# Patient Record
Sex: MALE | Race: WHITE | HISPANIC OR LATINO | Employment: OTHER | URBAN - METROPOLITAN AREA
[De-identification: names, ages, dates, MRNs, and addresses within clinical notes are randomized per-mention and may not be internally consistent; named-entity substitution may affect disease eponyms.]

---

## 2020-07-08 ENCOUNTER — HOSPITAL ENCOUNTER (EMERGENCY)
Facility: HOSPITAL | Age: 30
Discharge: HOME | End: 2020-07-08
Attending: EMERGENCY MEDICINE
Payer: OTHER GOVERNMENT

## 2020-07-08 VITALS
HEART RATE: 74 BPM | TEMPERATURE: 99 F | SYSTOLIC BLOOD PRESSURE: 143 MMHG | OXYGEN SATURATION: 95 % | RESPIRATION RATE: 18 BRPM | WEIGHT: 220 LBS | BODY MASS INDEX: 32.58 KG/M2 | DIASTOLIC BLOOD PRESSURE: 91 MMHG | HEIGHT: 69 IN

## 2020-07-08 DIAGNOSIS — R10.9 FLANK PAIN: ICD-10-CM

## 2020-07-08 DIAGNOSIS — R10.9 RIGHT FLANK PAIN: Primary | ICD-10-CM

## 2020-07-08 LAB
ALBUMIN SERPL BCP-MCNC: 4.6 G/DL (ref 3.5–5.2)
ALP SERPL-CCNC: 86 U/L (ref 55–135)
ALT SERPL W/O P-5'-P-CCNC: 186 U/L (ref 10–44)
AMORPH CRY URNS QL MICRO: NORMAL
ANION GAP SERPL CALC-SCNC: 7 MMOL/L (ref 8–16)
AST SERPL-CCNC: 71 U/L (ref 10–40)
BACTERIA #/AREA URNS HPF: NORMAL /HPF
BASOPHILS # BLD AUTO: 0.05 K/UL (ref 0–0.2)
BASOPHILS NFR BLD: 1 % (ref 0–1.9)
BILIRUB SERPL-MCNC: 0.7 MG/DL (ref 0.1–1)
BILIRUB UR QL STRIP: NEGATIVE
BUN SERPL-MCNC: 11 MG/DL (ref 6–20)
CALCIUM SERPL-MCNC: 9.7 MG/DL (ref 8.7–10.5)
CHLORIDE SERPL-SCNC: 103 MMOL/L (ref 95–110)
CLARITY UR: CLEAR
CO2 SERPL-SCNC: 27 MMOL/L (ref 23–29)
COLOR UR: YELLOW
CREAT SERPL-MCNC: 0.9 MG/DL (ref 0.5–1.4)
DIFFERENTIAL METHOD: ABNORMAL
EOSINOPHIL # BLD AUTO: 0 K/UL (ref 0–0.5)
EOSINOPHIL NFR BLD: 0.8 % (ref 0–8)
ERYTHROCYTE [DISTWIDTH] IN BLOOD BY AUTOMATED COUNT: 12.8 % (ref 11.5–14.5)
EST. GFR  (AFRICAN AMERICAN): >60 ML/MIN/1.73 M^2
EST. GFR  (NON AFRICAN AMERICAN): >60 ML/MIN/1.73 M^2
GLUCOSE SERPL-MCNC: 98 MG/DL (ref 70–110)
GLUCOSE UR QL STRIP: NEGATIVE
HCT VFR BLD AUTO: 47.6 % (ref 40–54)
HGB BLD-MCNC: 16.4 G/DL (ref 14–18)
HGB UR QL STRIP: ABNORMAL
IMM GRANULOCYTES # BLD AUTO: 0.08 K/UL (ref 0–0.04)
IMM GRANULOCYTES NFR BLD AUTO: 1.7 % (ref 0–0.5)
KETONES UR QL STRIP: NEGATIVE
LEUKOCYTE ESTERASE UR QL STRIP: NEGATIVE
LIPASE SERPL-CCNC: 24 U/L (ref 4–60)
LYMPHOCYTES # BLD AUTO: 1.4 K/UL (ref 1–4.8)
LYMPHOCYTES NFR BLD: 28.4 % (ref 18–48)
MCH RBC QN AUTO: 29.5 PG (ref 27–31)
MCHC RBC AUTO-ENTMCNC: 34.5 G/DL (ref 32–36)
MCV RBC AUTO: 86 FL (ref 82–98)
MICROSCOPIC COMMENT: NORMAL
MONOCYTES # BLD AUTO: 0.4 K/UL (ref 0.3–1)
MONOCYTES NFR BLD: 8.3 % (ref 4–15)
NEUTROPHILS # BLD AUTO: 2.9 K/UL (ref 1.8–7.7)
NEUTROPHILS NFR BLD: 59.8 % (ref 38–73)
NITRITE UR QL STRIP: NEGATIVE
NRBC BLD-RTO: 0 /100 WBC
PH UR STRIP: 7 [PH] (ref 5–8)
PLATELET # BLD AUTO: 174 K/UL (ref 150–350)
PMV BLD AUTO: 10.5 FL (ref 9.2–12.9)
POTASSIUM SERPL-SCNC: 3.9 MMOL/L (ref 3.5–5.1)
PROT SERPL-MCNC: 7.6 G/DL (ref 6–8.4)
PROT UR QL STRIP: NEGATIVE
RBC # BLD AUTO: 5.56 M/UL (ref 4.6–6.2)
RBC #/AREA URNS HPF: 1 /HPF (ref 0–4)
SARS-COV-2 RDRP RESP QL NAA+PROBE: NEGATIVE
SODIUM SERPL-SCNC: 137 MMOL/L (ref 136–145)
SP GR UR STRIP: 1.01 (ref 1–1.03)
URN SPEC COLLECT METH UR: ABNORMAL
UROBILINOGEN UR STRIP-ACNC: NEGATIVE EU/DL
WBC # BLD AUTO: 4.83 K/UL (ref 3.9–12.7)
WBC #/AREA URNS HPF: 1 /HPF (ref 0–5)

## 2020-07-08 PROCEDURE — 85025 COMPLETE CBC W/AUTO DIFF WBC: CPT

## 2020-07-08 PROCEDURE — 80053 COMPREHEN METABOLIC PANEL: CPT

## 2020-07-08 PROCEDURE — 83690 ASSAY OF LIPASE: CPT

## 2020-07-08 PROCEDURE — 81000 URINALYSIS NONAUTO W/SCOPE: CPT

## 2020-07-08 PROCEDURE — 63600175 PHARM REV CODE 636 W HCPCS: Performed by: PHYSICIAN ASSISTANT

## 2020-07-08 PROCEDURE — 99285 EMERGENCY DEPT VISIT HI MDM: CPT | Mod: 25

## 2020-07-08 PROCEDURE — 96374 THER/PROPH/DIAG INJ IV PUSH: CPT

## 2020-07-08 PROCEDURE — U0002 COVID-19 LAB TEST NON-CDC: HCPCS

## 2020-07-08 RX ORDER — KETOROLAC TROMETHAMINE 30 MG/ML
15 INJECTION, SOLUTION INTRAMUSCULAR; INTRAVENOUS
Status: COMPLETED | OUTPATIENT
Start: 2020-07-08 | End: 2020-07-08

## 2020-07-08 RX ORDER — IBUPROFEN 600 MG/1
600 TABLET ORAL EVERY 6 HOURS PRN
Qty: 20 TABLET | Refills: 0 | Status: SHIPPED | OUTPATIENT
Start: 2020-07-08

## 2020-07-08 RX ORDER — ACETAMINOPHEN 500 MG
1000 TABLET ORAL EVERY 6 HOURS PRN
Qty: 60 TABLET | Refills: 0 | Status: SHIPPED | OUTPATIENT
Start: 2020-07-08

## 2020-07-08 RX ADMIN — KETOROLAC TROMETHAMINE 15 MG: 30 INJECTION, SOLUTION INTRAMUSCULAR at 09:07

## 2020-07-08 NOTE — ED PROVIDER NOTES
Encounter Date: 7/8/2020    SCRIBE #1 NOTE: I, Kaushal Gomes, am scribing for, and in the presence of,  Leonel Zhong MD. I have scribed the following portions of the note - Other sections scribed: HPI, ROS, PE.       History     Chief Complaint   Patient presents with    Flank Pain     Pt c/o constant RIGHT-sided flank pain x3 weeks. Denies abd pain, N/V/D, dysuria. Pain is 10/10. Denies taking meds PTA     CC: Flank Pain    HPI: This 30 y.o. Male with no pertinent past medical history presents to the emergency room for an evaluation of constant throbbing R flank pain for the past 3 weeks. Pt's pain worsened this morning. His pain is worse with moving during the day and goes away at night. Pt has worse pain leaning to the left. He denies dysuria, hematuria, fever, chills, nausea, vomiting or diarrhea. Pt has no daily medications. He did not take any pain medications. Pt has no other symptoms besides R flank pain.    The history is provided by the patient. No  was used.     Review of patient's allergies indicates:  No Known Allergies  History reviewed. No pertinent past medical history.  History reviewed. No pertinent surgical history.  History reviewed. No pertinent family history.  Social History     Tobacco Use    Smoking status: Never Smoker    Smokeless tobacco: Never Used   Substance Use Topics    Alcohol use: Not on file     Comment: socially    Drug use: Never     Review of Systems   Constitutional: Negative for chills and fever.   HENT: Negative for sore throat.    Respiratory: Negative for shortness of breath.    Cardiovascular: Negative for chest pain.   Gastrointestinal: Negative for abdominal pain, diarrhea, nausea and vomiting.   Genitourinary: Positive for flank pain. Negative for dysuria and hematuria.   Musculoskeletal: Negative for back pain.   Skin: Negative for rash.   Neurological: Negative for weakness.   Hematological: Does not bruise/bleed easily.       Physical  Exam     Initial Vitals [07/08/20 0825]   BP Pulse Resp Temp SpO2   (!) 183/115 92 18 98.6 °F (37 °C) 96 %      MAP       --         Physical Exam    Nursing note and vitals reviewed.  Constitutional: He appears well-developed and well-nourished. He is not diaphoretic. No distress.   HENT:   Head: Normocephalic and atraumatic.   Nose: Nose normal.   Mouth/Throat: Oropharynx is clear and moist. No oropharyngeal exudate.   Eyes: Conjunctivae and EOM are normal. Pupils are equal, round, and reactive to light. No scleral icterus.   Neck: Normal range of motion. Neck supple. No tracheal deviation present. No JVD present.   Cardiovascular: Normal rate, regular rhythm, normal heart sounds and intact distal pulses.   No murmur heard.  Pulmonary/Chest: Breath sounds normal. No stridor. No respiratory distress. He has no wheezes. He has no rhonchi. He has no rales.   Abdominal: Soft. Bowel sounds are normal. He exhibits no distension. There is no abdominal tenderness. There is no rebound and no guarding.   Musculoskeletal: Normal range of motion. No tenderness or edema.   Neurological: He is alert and oriented to person, place, and time. He has normal strength. No cranial nerve deficit or sensory deficit. GCS score is 15. GCS eye subscore is 4. GCS verbal subscore is 5. GCS motor subscore is 6.   Skin: Skin is warm and dry. Capillary refill takes less than 2 seconds. No rash noted. No erythema.   Psychiatric: He has a normal mood and affect.         ED Course   Procedures  Labs Reviewed   CBC W/ AUTO DIFFERENTIAL - Abnormal; Notable for the following components:       Result Value    Immature Granulocytes 1.7 (*)     Immature Grans (Abs) 0.08 (*)     All other components within normal limits   COMPREHENSIVE METABOLIC PANEL - Abnormal; Notable for the following components:    AST 71 (*)      (*)     Anion Gap 7 (*)     All other components within normal limits   URINALYSIS, REFLEX TO URINE CULTURE - Abnormal; Notable  for the following components:    Occult Blood UA 1+ (*)     All other components within normal limits    Narrative:     Specimen Source->Urine   LIPASE   SARS-COV-2 RNA AMPLIFICATION, QUAL   URINALYSIS MICROSCOPIC    Narrative:     Specimen Source->Urine          Imaging Results          X-Ray Chest AP Portable (Final result)  Result time 07/08/20 10:11:28    Final result by Klaus Salcedo MD (07/08/20 10:11:28)                 Impression:      As above.      Electronically signed by: Klaus Salcedo  Date:    07/08/2020  Time:    10:11             Narrative:    EXAMINATION:  XR CHEST AP PORTABLE    CLINICAL HISTORY:  Unspecified abdominal pain    TECHNIQUE:  Single frontal view of the chest was performed.    COMPARISON:  None available    FINDINGS:  No large airspace opacity or lobar consolidation.  No pleural effusion or pneumothorax.  Cardiomediastinal silhouette is within normal limits.  No acute osseous abnormality.                               CT Renal Stone Study ABD Pelvis WO (Final result)  Result time 07/08/20 09:54:40    Final result by Ayo Berry Jr., MD (07/08/20 09:54:40)                 Impression:      Bilateral nonobstructing nephrolithiasis      Electronically signed by: Ayo Crum Jr  Date:    07/08/2020  Time:    09:54             Narrative:    EXAMINATION:  CT RENAL STONE STUDY ABD PELVIS WO    CLINICAL HISTORY:  Flank pain, kidney stone suspected;    TECHNIQUE:  Low dose axial images, sagittal and coronal reformations were obtained from the lung bases to the pubic symphysis.  Contrast was not administered.    COMPARISON:  None    FINDINGS:  Lung Bases: Unremarkable.    Kidneys/ Ureters: There is a 2 mm nonobstructing stone in the midpole of the right kidney and 5 mm nonobstructing stone in the lower pole of left kidney.  No ureteral stones or hydronephrosis.    Liver: Enlarged and hypoattenuating compatible with severe fatty infiltration.    Gallbladder: No calcified  gallstones.    Bile Ducts: No evidence of dilated ducts.    Pancreas: No mass or peripancreatic fat stranding.    Spleen: Unremarkable.    Adrenals: Unremarkable.    Pelvic organs: Unremarkable.    GI Tract/Mesentery: No evidence of bowel obstruction or inflammation.    Retroperitoneum: No significant adenopathy.    Vasculature: No significant atherosclerosis or aneurysm.    Bones: Unremarkable.                                            Scribe Attestation:   Scribe #1: I performed the above scribed service and the documentation accurately describes the services I performed. I attest to the accuracy of the note.        MDM:    30 y.o.male with PMHx as noted above presents with bilateral flank pain, R>L. Physical exam remarkable for well appearing male, steady gait, abdomen soft, nt/nd, no CVA ttp.  ED workup remarkable for CBC/CMP - wnl, lipase - 24, u/a - unremarkble, COVID negative, CT renal with bilateral non-obstructing nephrolithiasis.  Pt presentation consistent with bilateral flank pain of likely MSK etiology with pain worse with movement, and going away at night with rest, exacerbated by movement, without acute finding noted. At this time given patient's history, physical exam, and ED workup do not suspect pancreatitis, testicular torsion, cholecystitis, appendicitis, SBO, diverticulitis, MI/ACS, acute hepatitis, pyelonephritis, ureterolithiasis, or any further malignant cause.  Pt advised on further sxm treatment as needed with tylenol/motrin.  Discussed diagnosis and further treatment with patient, including f/u with PCP in the next few days.  Return precautions given and all questions answered.  Patient in understanding of plan.  Pt discharged to home improved and stable.                          Clinical Impression:       ICD-10-CM ICD-9-CM   1. Right flank pain  R10.9 789.09   2. Flank pain  R10.9 789.09             ED Disposition Condition    Discharge Stable        ED Prescriptions     Medication Sig  Dispense Start Date End Date Auth. Provider    acetaminophen (TYLENOL) 500 MG tablet Take 2 tablets (1,000 mg total) by mouth every 6 (six) hours as needed. 60 tablet 7/8/2020  Leonel Zhong MD    ibuprofen (ADVIL,MOTRIN) 600 MG tablet Take 1 tablet (600 mg total) by mouth every 6 (six) hours as needed. 20 tablet 7/8/2020  Leonel Zhong MD        Follow-up Information     Follow up With Specialties Details Why Contact Info    [facility] Go to  If symptoms worsen [address] [facility] [id number] [phone]                      Scribe attestation: I, Leonel Zhong M.D., personally performed the services described in this documentation. All medical record entries made by the scribe were at my direction and in my presence.  I have reviewed the chart and agree that the record reflects my personal performance and is accurate and complete.               Leonel hZong MD  07/09/20 1921

## 2020-07-08 NOTE — ED TRIAGE NOTES
Pt reports R flank pain (10/10) starting 3 weeks ago that has been constant and unrelieved. Pt reports pain causing difficulty in ambulation and positioning. Pt denies fevers/chills, abdominal pain, dysuria. Pt reports numbness and tingling in both feet. NAD.

## 2021-01-20 ENCOUNTER — HOSPITAL ENCOUNTER (EMERGENCY)
Facility: HOSPITAL | Age: 31
Discharge: HOME | End: 2021-01-20
Attending: EMERGENCY MEDICINE
Payer: OTHER GOVERNMENT

## 2021-01-20 VITALS
TEMPERATURE: 99 F | HEART RATE: 72 BPM | RESPIRATION RATE: 18 BRPM | BODY MASS INDEX: 34.07 KG/M2 | HEIGHT: 69 IN | DIASTOLIC BLOOD PRESSURE: 95 MMHG | WEIGHT: 230 LBS | SYSTOLIC BLOOD PRESSURE: 170 MMHG | OXYGEN SATURATION: 95 %

## 2021-01-20 DIAGNOSIS — M79.604 RIGHT LEG PAIN: ICD-10-CM

## 2021-01-20 DIAGNOSIS — M54.41 ACUTE RIGHT-SIDED LOW BACK PAIN WITH RIGHT-SIDED SCIATICA: Primary | ICD-10-CM

## 2021-01-20 DIAGNOSIS — M54.31 SCIATICA OF RIGHT SIDE: ICD-10-CM

## 2021-01-20 PROCEDURE — 96372 THER/PROPH/DIAG INJ SC/IM: CPT | Mod: 59

## 2021-01-20 PROCEDURE — 25000003 PHARM REV CODE 250: Performed by: PHYSICIAN ASSISTANT

## 2021-01-20 PROCEDURE — 99284 EMERGENCY DEPT VISIT MOD MDM: CPT | Mod: 25

## 2021-01-20 PROCEDURE — 63600175 PHARM REV CODE 636 W HCPCS: Performed by: PHYSICIAN ASSISTANT

## 2021-01-20 RX ORDER — LIDOCAINE 50 MG/G
1 PATCH TOPICAL
Status: DISCONTINUED | OUTPATIENT
Start: 2021-01-20 | End: 2021-01-20 | Stop reason: HOSPADM

## 2021-01-20 RX ORDER — LIDOCAINE 50 MG/G
1 PATCH TOPICAL DAILY
Qty: 15 PATCH | Refills: 0 | Status: SHIPPED | OUTPATIENT
Start: 2021-01-20

## 2021-01-20 RX ORDER — HYDROCODONE BITARTRATE AND ACETAMINOPHEN 5; 325 MG/1; MG/1
1 TABLET ORAL EVERY 6 HOURS PRN
Qty: 11 TABLET | Refills: 0 | Status: SHIPPED | OUTPATIENT
Start: 2021-01-20

## 2021-01-20 RX ORDER — TRIAMCINOLONE ACETONIDE 40 MG/ML
60 INJECTION, SUSPENSION INTRA-ARTICULAR; INTRAMUSCULAR
Status: COMPLETED | OUTPATIENT
Start: 2021-01-20 | End: 2021-01-20

## 2021-01-20 RX ORDER — KETOROLAC TROMETHAMINE 30 MG/ML
30 INJECTION, SOLUTION INTRAMUSCULAR; INTRAVENOUS
Status: COMPLETED | OUTPATIENT
Start: 2021-01-20 | End: 2021-01-20

## 2021-01-20 RX ORDER — KETOROLAC TROMETHAMINE 10 MG/1
10 TABLET, FILM COATED ORAL EVERY 6 HOURS
Qty: 20 TABLET | Refills: 0 | Status: SHIPPED | OUTPATIENT
Start: 2021-01-20 | End: 2021-01-25

## 2021-01-20 RX ADMIN — KETOROLAC TROMETHAMINE 30 MG: 30 INJECTION, SOLUTION INTRAMUSCULAR at 01:01

## 2021-01-20 RX ADMIN — TRIAMCINOLONE ACETONIDE 60 MG: 40 INJECTION, SUSPENSION INTRA-ARTICULAR; INTRAMUSCULAR at 01:01

## 2021-01-20 RX ADMIN — LIDOCAINE 1 PATCH: 50 PATCH TOPICAL at 01:01

## 2021-02-19 ENCOUNTER — HOSPITAL ENCOUNTER (EMERGENCY)
Facility: HOSPITAL | Age: 31
Discharge: HOME | End: 2021-02-19
Attending: EMERGENCY MEDICINE
Payer: OTHER GOVERNMENT

## 2021-02-19 VITALS
WEIGHT: 220 LBS | TEMPERATURE: 98 F | RESPIRATION RATE: 18 BRPM | DIASTOLIC BLOOD PRESSURE: 87 MMHG | HEIGHT: 69 IN | SYSTOLIC BLOOD PRESSURE: 197 MMHG | HEART RATE: 71 BPM | OXYGEN SATURATION: 100 % | BODY MASS INDEX: 32.58 KG/M2

## 2021-02-19 DIAGNOSIS — M54.41 ACUTE RIGHT-SIDED LOW BACK PAIN WITH RIGHT-SIDED SCIATICA: Primary | ICD-10-CM

## 2021-02-19 PROBLEM — Z72.0 TOBACCO USER: Status: ACTIVE | Noted: 2021-02-19

## 2021-02-19 PROBLEM — F41.9 ANXIETY: Status: ACTIVE | Noted: 2021-02-19

## 2021-02-19 PROCEDURE — 63600175 PHARM REV CODE 636 W HCPCS: Performed by: PHYSICIAN ASSISTANT

## 2021-02-19 PROCEDURE — 99284 EMERGENCY DEPT VISIT MOD MDM: CPT | Mod: 25

## 2021-02-19 PROCEDURE — 96372 THER/PROPH/DIAG INJ SC/IM: CPT

## 2021-02-19 RX ORDER — PREDNISONE 20 MG/1
60 TABLET ORAL
Status: COMPLETED | OUTPATIENT
Start: 2021-02-19 | End: 2021-02-19

## 2021-02-19 RX ORDER — KETOROLAC TROMETHAMINE 10 MG/1
10 TABLET, FILM COATED ORAL EVERY 6 HOURS
Qty: 20 TABLET | Refills: 0 | Status: SHIPPED | OUTPATIENT
Start: 2021-02-19 | End: 2021-02-24

## 2021-02-19 RX ORDER — PREDNISONE 20 MG/1
60 TABLET ORAL DAILY
Qty: 9 TABLET | Refills: 0 | Status: SHIPPED | OUTPATIENT
Start: 2021-02-19 | End: 2021-02-22

## 2021-02-19 RX ORDER — KETOROLAC TROMETHAMINE 30 MG/ML
30 INJECTION, SOLUTION INTRAMUSCULAR; INTRAVENOUS
Status: COMPLETED | OUTPATIENT
Start: 2021-02-19 | End: 2021-02-19

## 2021-02-19 RX ADMIN — PREDNISONE 60 MG: 20 TABLET ORAL at 04:02

## 2021-02-19 RX ADMIN — KETOROLAC TROMETHAMINE 30 MG: 30 INJECTION, SOLUTION INTRAMUSCULAR; INTRAVENOUS at 04:02
